# Patient Record
Sex: FEMALE | Race: WHITE | URBAN - METROPOLITAN AREA
[De-identification: names, ages, dates, MRNs, and addresses within clinical notes are randomized per-mention and may not be internally consistent; named-entity substitution may affect disease eponyms.]

---

## 2017-02-17 ENCOUNTER — OFFICE VISIT (OUTPATIENT)
Dept: URGENT CARE | Facility: RETAIL CLINIC | Age: 47
End: 2017-02-17
Payer: COMMERCIAL

## 2017-02-17 VITALS
HEART RATE: 91 BPM | TEMPERATURE: 98.5 F | OXYGEN SATURATION: 99 % | DIASTOLIC BLOOD PRESSURE: 87 MMHG | SYSTOLIC BLOOD PRESSURE: 132 MMHG

## 2017-02-17 DIAGNOSIS — J02.9 ACUTE PHARYNGITIS, UNSPECIFIED ETIOLOGY: Primary | ICD-10-CM

## 2017-02-17 DIAGNOSIS — J01.90 ACUTE SINUSITIS WITH SYMPTOMS > 10 DAYS: ICD-10-CM

## 2017-02-17 PROCEDURE — 87081 CULTURE SCREEN ONLY: CPT | Performed by: NURSE PRACTITIONER

## 2017-02-17 PROCEDURE — 99202 OFFICE O/P NEW SF 15 MIN: CPT | Performed by: NURSE PRACTITIONER

## 2017-02-17 PROCEDURE — 87880 STREP A ASSAY W/OPTIC: CPT | Mod: QW | Performed by: NURSE PRACTITIONER

## 2017-02-17 NOTE — MR AVS SNAPSHOT
"              After Visit Summary   2017    Nichole Childress    MRN: [id number]           Patient Information     Date Of Birth          1970        Visit Information        Provider Department      2017 6:40 PM Kamlesh Borja APRN [facility]        Today's Diagnoses     Acute pharyngitis, unspecified etiology    -  1    Acute sinusitis with symptoms > 10 days           Follow-ups after your visit        Who to contact     You can reach your care team any time of the day by calling [phone].  Notification of test results:  If you have an abnormal lab result, we will notify you by phone as soon as possible.         Additional Information About Your Visit        MyChart Information     [employer] lets you send messages to your doctor, view your test results, renew your prescriptions, schedule appointments and more. To sign up, go to [email or web address]: turn off the lights . Click on \"Log in\" on the left side of the screen, which will take you to the Welcome page. Then click on \"Sign up Now\" on the right side of the page.     You will be asked to enter the access code listed below, as well as some personal information. Please follow the directions to create your username and password.     Your access code is: [email or web address]  Expires: 2017  7:43 PM     Your access code will  in 90 days. If you need help or a new code, please call your [place] clinic or [phone].        Care EveryWhere ID     This is your [facility] EveryWhere ID. This could be used by other organizations to access your [place] medical records  [id number]        Your Vitals Were     Pulse Temperature Pulse Oximetry             91 98.5  F (36.9  C) (Oral) 99%          Blood Pressure from Last 3 Encounters:   17 132/87   13 132/87   12 126/84    Weight from Last 3 Encounters:   12 251 lb 9.6 oz (114.1 kg)   09 268 lb 12.8 oz (121.9 kg)   09 268 lb (121.6 kg)              We Performed " the Following     BETA STREP GROUP A R/O CULTURE     RAPID STREP SCREEN          Today's Medication Changes          These changes are accurate as of: 2/17/17  7:43 PM.  If you have any questions, ask your nurse or doctor.               Start taking these medicines.        Dose/Directions    amoxicillin-clavulanate 875-125 MG per tablet   Commonly known as:  AUGMENTIN   Used for:  Acute sinusitis with symptoms > 10 days        Dose:  1 tablet   Take 1 tablet by mouth 2 times daily for 14 days   Quantity:  28 tablet   Refills:  0            Where to get your medicines      These medications were sent to [address]  [address] [address]     Phone:  [phone]     amoxicillin-clavulanate 875-125 MG per tablet                Primary Care Provider Office Phone # Fax #    Ladonna Leger [phone]       [facility]  [address] [address]        Thank you!     Thank you for choosing [facility]  for your care. Our goal is always to provide you with excellent care. Hearing back from our patients is one way we can continue to improve our services. Please take a few minutes to complete the written survey that you may receive in the mail after your visit with us. Thank you!             Your Updated Medication List - Protect others around you: Learn how to safely use, store and throw away your medicines at www.disposemymeds.org.          This list is accurate as of: 2/17/17  7:43 PM.  Always use your most recent med list.                   Brand Name Dispense Instructions for use    ALEVE 220 MG capsule   Generic drug:  naproxen sodium      1 CAPSULE EVERY 12 HOURS AS NEEDED       amoxicillin-clavulanate 875-125 MG per tablet    AUGMENTIN    28 tablet    Take 1 tablet by mouth 2 times daily for 14 days       COQ10 PO          FISH OIL          MOTRIN 400 MG tablet   Generic drug:  ibuprofen      1 TABLET EVERY 4  HOURS AS NEEDED FOR PAIN       MULTIVITAMIN & MINERAL PO          VITAMIN B PLUS+ PO          VITAMIN D PO      Reported on 2/17/2017       ZINC 15 PO

## 2017-02-18 NOTE — PROGRESS NOTES
[facility] clinic note    SUBJECTIVE:  Nichole Childress is a 46 year old female who presents to [facility] clinic with chief complaint of sore throat.    Onset of symptoms was 2 week(s) ago.    Course of illness: still present.    Severity moderate  Course of illness:  SIGNIFICANTLY WORSE TODAY  Current and Associated symptoms: nasal congestion, rhinorrhea, cough, feels flushed, throat felt like closing last night, sore throat, facial pain/pressure, hoarse voice, headache and malaise  Treatment measures tried at home include OTC meds and home remedies.  Predisposing factors include recent illness and travel.    Current Outpatient Prescriptions   Medication     Multiple Vitamins-Minerals (MULTIVITAMIN & MINERAL PO)     FISH OIL     Coenzyme Q10 (COQ10 PO)     Vit B6-Vit B12-Omega 3 Acids (VITAMIN B PLUS+ PO)     Cholecalciferol (VITAMIN D PO)     Zinc Sulfate (ZINC 15 PO)     MOTRIN 400 MG OR TABS     ALEVE 220 MG OR CAPS     No current facility-administered medications for this visit.      PAST MEDICAL HISTORY:   Past Medical History   Diagnosis Date     Backache, unspecified      Obesity, unspecified      Other specified cardiac dysrhythmias 04/03/09     D/C 04/04/09       PAST SURGICAL HISTORY:   Past Surgical History   Procedure Laterality Date     Surgical history of -   5/14/98     Gastroscopy with distal esophageal biopsy       FAMILY HISTORY: No family history on file.    SOCIAL HISTORY:   Social History   Substance Use Topics     Smoking status: Former Smoker     Quit date: 5/15/2003     Smokeless tobacco: Not on file     Alcohol use Yes      Comment: socially     ROS:  Review of systems negative except as stated above.    OBJECTIVE:   Vitals:    02/17/17 1901   BP: 132/87   BP Location: Right arm   Pulse: 91   Temp: 98.5  F (36.9  C)   TempSrc: Oral   SpO2: 99%     GENERAL APPEARANCE: alert, moderate distress and cooperative  EYES: EOMI,  PERRL, conjunctiva  clear  HENT: ear canals and TM's normal.  Nose congested.  Pharynx mildly erythematous noted.  HENT: maxillary sinus tenderness   NECK: bilateral anterior cervical adenopathy  RESP: lungs clear to auscultation - no rales, rhonchi or wheezes  CV: regular rates and rhythm, normal S1 S2, no murmur noted  ABDOMEN:  soft, nontender, no HSM or masses and bowel sounds normal  SKIN: no suspicious lesions or rashes     Rapid Strep test is negative; await throat culture results.    ASSESSMENT:  Sore Throat J02.9  Sinusitis >10 days J01.90    PLAN:   Augmentin  If not improving Follow up at:  [facility] [phone]  Encourage good hydration (mainly water), may drink tea /c honey, warm chicken broth to sooth throat.  Soft foods may be preferred for several days.  Symptomatic treatment with warm Na+ H2O gargles, OTC analgesic, etc. discussed.   Strep culture pending.   Nichole Childress told positive cultures called only.  Rest as needed.  Follow-up with primary care provider if not improving.    If difficulty breathing or swallowing be seen immediately in the ED.    Kamlesh CORDON, MSN, Family NP-C  [facility]

## 2017-02-18 NOTE — NURSING NOTE
"Chief Complaint   Patient presents with     Pharyngitis     since last tuesday, throat seems to hurt more in the morning, no fevers but has been having night sweats     Sinus Problem     since last night, sinus pressure     Cough     since last tuesday       Initial /87 (BP Location: Right arm)  Pulse 91  Temp 98.5  F (36.9  C) (Oral)  SpO2 99% Estimated body mass index is 40 kg/(m^2) as calculated from the following:    Height as of 9/12/12: 5' 6.5\" (1.689 m).    Weight as of 9/12/12: 251 lb 9.6 oz (114.1 kg).  Medication Reconciliation: complete    "

## 2017-02-20 LAB — BETA STREP CONFIRM: NORMAL

## 2017-10-15 ENCOUNTER — HOSPITAL ENCOUNTER (EMERGENCY)
Facility: CLINIC | Age: 47
Discharge: HOME | End: 2017-10-16
Attending: EMERGENCY MEDICINE | Admitting: EMERGENCY MEDICINE
Payer: COMMERCIAL

## 2017-10-15 ENCOUNTER — APPOINTMENT (OUTPATIENT)
Dept: GENERAL RADIOLOGY | Facility: CLINIC | Age: 47
End: 2017-10-15
Attending: EMERGENCY MEDICINE
Payer: COMMERCIAL

## 2017-10-15 DIAGNOSIS — R07.89 CHEST WALL PAIN: ICD-10-CM

## 2017-10-15 LAB
ALBUMIN SERPL-MCNC: 4 G/DL (ref 3.4–5)
ALP SERPL-CCNC: 59 U/L (ref 40–150)
ALT SERPL W P-5'-P-CCNC: 27 U/L (ref 0–50)
ANION GAP SERPL CALCULATED.3IONS-SCNC: 7 MMOL/L (ref 3–14)
AST SERPL W P-5'-P-CCNC: 23 U/L (ref 0–45)
BASOPHILS # BLD AUTO: 0 10E9/L (ref 0–0.2)
BASOPHILS NFR BLD AUTO: 0.5 %
BILIRUB SERPL-MCNC: 0.5 MG/DL (ref 0.2–1.3)
BUN SERPL-MCNC: 10 MG/DL (ref 7–30)
CALCIUM SERPL-MCNC: 9.2 MG/DL (ref 8.5–10.1)
CHLORIDE SERPL-SCNC: 107 MMOL/L (ref 94–109)
CO2 SERPL-SCNC: 25 MMOL/L (ref 20–32)
CREAT SERPL-MCNC: 0.66 MG/DL (ref 0.52–1.04)
CRP SERPL-MCNC: <2.9 MG/L (ref 0–8)
D DIMER PPP FEU-MCNC: 0.4 UG/ML FEU (ref 0–0.5)
DIFFERENTIAL METHOD BLD: NORMAL
EOSINOPHIL # BLD AUTO: 0 10E9/L (ref 0–0.7)
EOSINOPHIL NFR BLD AUTO: 0.5 %
ERYTHROCYTE [DISTWIDTH] IN BLOOD BY AUTOMATED COUNT: 13.1 % (ref 10–15)
GFR SERPL CREATININE-BSD FRML MDRD: >90 ML/MIN/1.7M2
GLUCOSE SERPL-MCNC: 96 MG/DL (ref 70–99)
HCT VFR BLD AUTO: 41.2 % (ref 35–47)
HGB BLD-MCNC: 13.2 G/DL (ref 11.7–15.7)
IMM GRANULOCYTES # BLD: 0 10E9/L (ref 0–0.4)
IMM GRANULOCYTES NFR BLD: 0.1 %
LACTATE BLD-SCNC: 1.3 MMOL/L (ref 0.7–2)
LIPASE SERPL-CCNC: 194 U/L (ref 73–393)
LYMPHOCYTES # BLD AUTO: 3 10E9/L (ref 0.8–5.3)
LYMPHOCYTES NFR BLD AUTO: 34.7 %
MCH RBC QN AUTO: 30.6 PG (ref 26.5–33)
MCHC RBC AUTO-ENTMCNC: 32 G/DL (ref 31.5–36.5)
MCV RBC AUTO: 96 FL (ref 78–100)
MONOCYTES # BLD AUTO: 0.8 10E9/L (ref 0–1.3)
MONOCYTES NFR BLD AUTO: 8.8 %
NEUTROPHILS # BLD AUTO: 4.7 10E9/L (ref 1.6–8.3)
NEUTROPHILS NFR BLD AUTO: 55.4 %
PLATELET # BLD AUTO: 326 10E9/L (ref 150–450)
POTASSIUM SERPL-SCNC: 3.4 MMOL/L (ref 3.4–5.3)
PROT SERPL-MCNC: 8.1 G/DL (ref 6.8–8.8)
RBC # BLD AUTO: 4.31 10E12/L (ref 3.8–5.2)
SODIUM SERPL-SCNC: 139 MMOL/L (ref 133–144)
TROPONIN I SERPL-MCNC: <0.015 UG/L (ref 0–0.04)
WBC # BLD AUTO: 8.5 10E9/L (ref 4–11)

## 2017-10-15 PROCEDURE — 25000128 H RX IP 250 OP 636: Performed by: EMERGENCY MEDICINE

## 2017-10-15 PROCEDURE — 93010 ELECTROCARDIOGRAM REPORT: CPT | Mod: 59 | Performed by: EMERGENCY MEDICINE

## 2017-10-15 PROCEDURE — 93005 ELECTROCARDIOGRAM TRACING: CPT | Performed by: EMERGENCY MEDICINE

## 2017-10-15 PROCEDURE — 96360 HYDRATION IV INFUSION INIT: CPT | Performed by: EMERGENCY MEDICINE

## 2017-10-15 PROCEDURE — 84484 ASSAY OF TROPONIN QUANT: CPT | Performed by: EMERGENCY MEDICINE

## 2017-10-15 PROCEDURE — 83690 ASSAY OF LIPASE: CPT | Performed by: EMERGENCY MEDICINE

## 2017-10-15 PROCEDURE — 85379 FIBRIN DEGRADATION QUANT: CPT | Performed by: EMERGENCY MEDICINE

## 2017-10-15 PROCEDURE — 80053 COMPREHEN METABOLIC PANEL: CPT | Performed by: EMERGENCY MEDICINE

## 2017-10-15 PROCEDURE — 83605 ASSAY OF LACTIC ACID: CPT | Performed by: EMERGENCY MEDICINE

## 2017-10-15 PROCEDURE — 71020 XR CHEST 2 VW: CPT | Mod: TC

## 2017-10-15 PROCEDURE — 86140 C-REACTIVE PROTEIN: CPT | Performed by: EMERGENCY MEDICINE

## 2017-10-15 PROCEDURE — 85025 COMPLETE CBC W/AUTO DIFF WBC: CPT | Performed by: EMERGENCY MEDICINE

## 2017-10-15 PROCEDURE — 99285 EMERGENCY DEPT VISIT HI MDM: CPT | Mod: 25 | Performed by: EMERGENCY MEDICINE

## 2017-10-15 RX ORDER — SODIUM CHLORIDE 9 MG/ML
1000 INJECTION, SOLUTION INTRAVENOUS CONTINUOUS
Status: DISCONTINUED | OUTPATIENT
Start: 2017-10-15 | End: 2017-10-16 | Stop reason: HOSPADM

## 2017-10-15 RX ADMIN — SODIUM CHLORIDE 1000 ML: 9 INJECTION, SOLUTION INTRAVENOUS at 22:57

## 2017-10-15 NOTE — ED AVS SNAPSHOT
[facility] Emergency Department    [address] [place] [address]    Phone:  [phone]    Fax:  [phone]                                       Nichole Childress   MRN: [id number]    Department:  [facility] Emergency Department   Date of Visit:  10/15/2017           After Visit Summary Signature Page     I have received my discharge instructions, and my questions have been answered. I have discussed any challenges I see with this plan with the nurse or doctor.    ..........................................................................................................................................  Patient/Patient Representative Signature      ..........................................................................................................................................  Patient Representative Print Name and Relationship to Patient    ..................................................               ................................................  Date                                            Time    ..........................................................................................................................................  Reviewed by Signature/Title    ...................................................              ..............................................  Date                                                            Time

## 2017-10-15 NOTE — ED AVS SNAPSHOT
[facility] Emergency Department    [address] [place] [address]    Phone:  [phone]    Fax:  [phone]                                       Nichole Childress   MRN: [id number]    Department:  [facility] Emergency Department   Date of Visit:  10/15/2017           Patient Information     Date Of Birth          1970        Your diagnoses for this visit were:     Chest wall pain        You were seen by Karthik Campuzano MD.      Follow-up Information     Schedule an appointment as soon as possible for a visit with Justin Wray MD.    Specialty:  Family Practice    Contact information:    [address] [place] [address]  [phone]          Discharge Instructions         Chest Wall Pain: Costochondritis    The chest pain that you have had today is caused by costochondritis. This condition is caused by an inflammation of the cartilage joining your ribs to your breastbone. It is not caused by heart or lung problems. Your healthcare team has made sure that the chest pain you feel is not from a life threatening cause of chest pain such as heart attack, collapsed lung, blood clot in the lung, tear in the aorta, or esophageal rupture. The inflammation may have been brought on by a blow to the chest, lifting heavy objects, intense exercise, or an illness that made you cough and sneeze a lot. It often occurs during times of emotional stress. It can be painful, but it is not dangerous. It usually goes away in 1 to 2 weeks. But it may happen again. Rarely, a more serious condition may cause symptoms similar to costochondritis. That s why it s important to watch for the warning signs listed below.  Home care  Follow these guidelines when caring for yourself at home:    If you feel that emotional stress is a cause of your condition, try to figure out the sources of that stress. It may not be obvious. Learn ways to deal with the stress in your life. This can include  regular exercise, muscle relaxation, meditation, or simply taking time out for yourself.    You may use acetaminophen, ibuprofen, or naproxen to control pain, unless another pain medicine was prescribed. If you have liver or kidney disease or ever had a stomach ulcer, talk with your healthcare provider before using these medicines.    You can also help ease pain by using a hot, wet compress or heating pad. Use this with or without a medicated skin cream that helps relieves pain.    Do stretching exercise as advised by your provider.    Take any prescribed medicines as directed.  Follow-up care  Follow up with your healthcare provider, or as advised, if you do not start to get better in the next 2 days.  When to seek medical advice  Call your healthcare provider right away if any of these occur:    A change in the type of pain. Call if it feels different, becomes more serious, lasts longer, or spreads into your shoulder, arm, neck, jaw, or back.    Shortness of breath or pain gets worse when you breathe    Weakness, dizziness, or fainting    Cough with dark-colored sputum (phlegm) or blood    Abdominal pain    Dark red or black stools    Fever of 100.4 F (38 C) or higher, or as directed by your healthcare provider  Date Last Reviewed: 12/1/2016 2000-2017 [employer]. [address] [place] [address], [place] [address]. All rights reserved. This information is not intended as a substitute for professional medical care. Always follow your healthcare professional's instructions.      I would recommend that you contact the sleep center and arranged for a sleep study..  You may also do this through your primary care provider in the clinic.     Discharge References/Attachments     SLEEP APNEA, OBSTRUCTIVE (ENGLISH)      24 Hour Appointment Hotline       To make an appointment at any [facility], call [phone] ([phone]). If you don't have a family doctor or clinic, we will help you find one. [facility]  clinics are conveniently located to serve the needs of you and your family.             Review of your medicines      Our records show that you are taking the medicines listed below. If these are incorrect, please call your family doctor or clinic.        Dose / Directions Last dose taken    ALEVE 220 MG capsule   Generic drug:  naproxen sodium        1 CAPSULE EVERY 12 HOURS AS NEEDED   Refills:  0        aspirin 81 MG tablet   Dose:  81 mg        Take 81 mg by mouth once   Refills:  0        COQ10 PO        Refills:  0        FISH OIL        Refills:  0        MOTRIN 400 MG tablet   Generic drug:  ibuprofen        1 TABLET EVERY 4 HOURS AS NEEDED FOR PAIN   Refills:  0        MULTIVITAMIN & MINERAL PO        Refills:  0        VITAMIN B PLUS+ PO        Refills:  0        VITAMIN D PO        Reported on 2/17/2017   Refills:  0        ZINC 15 PO        Refills:  0                Procedures and tests performed during your visit     CBC with platelets differential    CRP inflammation    Comprehensive metabolic panel    D dimer quantitative    EKG 12-lead, tracing only    Lactic acid whole blood    Lipase    Peripheral IV: Standard    Troponin I    XR Chest 2 Views      Orders Needing Specimen Collection     None      Pending Results     No orders found for last 3 day(s).            Pending Culture Results     No orders found for last 3 day(s).            Pending Results Instructions     If you had any lab results that were not finalized at the time of your Discharge, you can call the ED Lab Result RN at [phone]. You will be contacted by this team for any positive Lab results or changes in treatment. The nurses are available 7 days a week from 10A to 6:30P.  You can leave a message 24 hours per day and they will return your call.        Thank you for choosing [facility]       Thank you for choosing [facility] for your care. Our goal is always to provide you with excellent care. Hearing back from our patients is one  "way we can continue to improve our services. Please take a few minutes to complete the written survey that you may receive in the mail after you visit with us. Thank you!        [employer] Information     [employer] lets you send messages to your doctor, view your test results, renew your prescriptions, schedule appointments and more. To sign up, go to [email or web address] . Click on \"Log in\" on the left side of the screen, which will take you to the Welcome page. Then click on \"Sign up Now\" on the right side of the page.     You will be asked to enter the access code listed below, as well as some personal information. Please follow the directions to create your username and password.     Your access code is: [email or web address]  Expires: 2018 11:59 PM     Your access code will  in 90 days. If you need help or a new code, please call your [place] clinic or [phone].        Care EveryWhere ID     This is your Care EveryWhere ID. This could be used by other organizations to access your [place] medical records  [id number]        Equal Access to Services     [facility] : Hadjune Roth, delonte fierro, ankita lee, jarett patel. So [facility] [phone].    ATENCIÓN: Si habla español, tiene a [place] disposición servicios gratuitos de asistencia lingüística. Stephen al [phone].    We comply with applicable federal civil rights laws and [place] laws. We do not discriminate on the basis of race, color, national origin, age, disability, sex, sexual orientation, or gender identity.            After Visit Summary       This is your record. Keep this with you and show to your community pharmacist(s) and doctor(s) at your next visit.                  "

## 2017-10-16 VITALS
SYSTOLIC BLOOD PRESSURE: 139 MMHG | OXYGEN SATURATION: 99 % | BODY MASS INDEX: 31.08 KG/M2 | WEIGHT: 198 LBS | RESPIRATION RATE: 18 BRPM | DIASTOLIC BLOOD PRESSURE: 89 MMHG | HEIGHT: 67 IN | TEMPERATURE: 98.2 F | HEART RATE: 64 BPM

## 2017-10-16 ASSESSMENT — ENCOUNTER SYMPTOMS
CHILLS: 0
COUGH: 0
PALPITATIONS: 0
SHORTNESS OF BREATH: 0
NAUSEA: 0
FEVER: 0

## 2017-10-16 NOTE — DISCHARGE INSTRUCTIONS
Chest Wall Pain: Costochondritis    The chest pain that you have had today is caused by costochondritis. This condition is caused by an inflammation of the cartilage joining your ribs to your breastbone. It is not caused by heart or lung problems. Your healthcare team has made sure that the chest pain you feel is not from a life threatening cause of chest pain such as heart attack, collapsed lung, blood clot in the lung, tear in the aorta, or esophageal rupture. The inflammation may have been brought on by a blow to the chest, lifting heavy objects, intense exercise, or an illness that made you cough and sneeze a lot. It often occurs during times of emotional stress. It can be painful, but it is not dangerous. It usually goes away in 1 to 2 weeks. But it may happen again. Rarely, a more serious condition may cause symptoms similar to costochondritis. That s why it s important to watch for the warning signs listed below.  Home care  Follow these guidelines when caring for yourself at home:    If you feel that emotional stress is a cause of your condition, try to figure out the sources of that stress. It may not be obvious. Learn ways to deal with the stress in your life. This can include regular exercise, muscle relaxation, meditation, or simply taking time out for yourself.    You may use acetaminophen, ibuprofen, or naproxen to control pain, unless another pain medicine was prescribed. If you have liver or kidney disease or ever had a stomach ulcer, talk with your healthcare provider before using these medicines.    You can also help ease pain by using a hot, wet compress or heating pad. Use this with or without a medicated skin cream that helps relieves pain.    Do stretching exercise as advised by your provider.    Take any prescribed medicines as directed.  Follow-up care  Follow up with your healthcare provider, or as advised, if you do not start to get better in the next 2 days.  When to seek medical  advice  Call your healthcare provider right away if any of these occur:    A change in the type of pain. Call if it feels different, becomes more serious, lasts longer, or spreads into your shoulder, arm, neck, jaw, or back.    Shortness of breath or pain gets worse when you breathe    Weakness, dizziness, or fainting    Cough with dark-colored sputum (phlegm) or blood    Abdominal pain    Dark red or black stools    Fever of 100.4 F (38 C) or higher, or as directed by your healthcare provider  Date Last Reviewed: 12/1/2016 2000-2017 [employer]. [address] [place] [address]. All rights reserved. This information is not intended as a substitute for professional medical care. Always follow your healthcare professional's instructions.      I would recommend that you contact the sleep center and arranged for a sleep study..  You may also do this through your primary care provider in the clinic.

## 2017-10-16 NOTE — ED NOTES
Pt very chatty in room, states she is not really having pain it is more like just kind a different type feeling.

## 2017-10-16 NOTE — ED PROVIDER NOTES
History     Chief Complaint   Patient presents with     Chest Pain     The history is provided by the patient.     Nichole Childress is a 47 year old female who presents to ED for evaluation of left-sided chest pressure/pain that started several days ago and continues to tonight.  She also had an episode last night where she was startled awake from sleep with this pain.  She denies any fever, chills, cough, shortness of breath, nausea or vomiting.  She does have a history significant for paroxysmal supraventricular tachycardia and is status post ablation for this.  She also has a presenting complaint of possible obstructive sleep apnea as the , is that she is quite active during sleep and she reports that she has daytime somnolence and feels tired even after sleeping.    I have reviewed the Medications, Allergies, Past Medical and Surgical History, and Social History in the Epic system.    Allergies: No Known Allergies      No current facility-administered medications on file prior to encounter.   Current Outpatient Prescriptions on File Prior to Encounter:  Multiple Vitamins-Minerals (MULTIVITAMIN & MINERAL PO)    FISH OIL    Coenzyme Q10 (COQ10 PO)    Vit B6-Vit B12-Omega 3 Acids (VITAMIN B PLUS+ PO)    Zinc Sulfate (ZINC 15 PO)    MOTRIN 400 MG OR TABS 1 TABLET EVERY 4 HOURS AS NEEDED FOR PAIN   ALEVE 220 MG OR CAPS 1 CAPSULE EVERY 12 HOURS AS NEEDED   Cholecalciferol (VITAMIN D PO) Reported on 2/17/2017       Patient Active Problem List   Diagnosis     SVT (supraventricular tachycardia) (H)     CARDIOVASCULAR SCREENING; LDL GOAL LESS THAN 160       Past Surgical History:   Procedure Laterality Date     CARDIAC SURGERY       SURGICAL HISTORY OF -   5/14/98    Gastroscopy with distal esophageal biopsy       Social History   Substance Use Topics     Smoking status: Former Smoker     Quit date: 5/15/2003     Smokeless tobacco: Never Used     Alcohol use Yes      Comment: socially       Most Recent  "Immunizations   Administered Date(s) Administered     Influenza (IIV3) 11/12/1997     TDAP Vaccine (Adacel) 07/23/2009       BMI: Estimated body mass index is 31.01 kg/(m^2) as calculated from the following:    Height as of this encounter: 1.702 m (5' 7\").    Weight as of this encounter: 89.8 kg (198 lb).      Review of Systems   Constitutional: Negative for chills and fever.   Respiratory: Negative for cough and shortness of breath.    Cardiovascular: Positive for chest pain. Negative for palpitations.   Gastrointestinal: Negative for nausea.   All other systems reviewed and are negative.      Physical Exam   BP: 142/90  Pulse: 64  Heart Rate: 66  Temp: 98.2  F (36.8  C)  Resp: 20  Height: 170.2 cm (5' 7\")  Weight: 89.8 kg (198 lb)  SpO2: 99 %       Physical Exam   Constitutional: She is oriented to person, place, and time. She appears well-developed. No distress.   HENT:   Head: Atraumatic.   Mouth/Throat: Oropharynx is clear and moist. No oropharyngeal exudate.   Eyes: EOM are normal. Pupils are equal, round, and reactive to light. No scleral icterus.   Neck: Normal range of motion. Neck supple.   Cardiovascular: Normal rate, normal heart sounds and intact distal pulses.    Pulmonary/Chest: Breath sounds normal. No respiratory distress. She exhibits tenderness (Tenderness to palpation over the upper lateral left chest that reproduces her symptoms.).   Abdominal: Soft. Bowel sounds are normal. There is no tenderness.   Musculoskeletal: Normal range of motion. She exhibits no edema or tenderness.   Neurological: She is alert and oriented to person, place, and time.   Skin: Skin is warm. No rash noted. She is not diaphoretic.   Nursing note and vitals reviewed.      ED Course     ED Course     Procedures          EKG: 10/15/2017 2237 This EKG was reviewed and interpreted by me.  Normal sinus rhythm with rate of 73 beats per minute.  Incomplete right bundle-branch block with a left anterior fascicular block.  LVH by " voltage criteria.  Normal IN interval.  Normal ST segments.  No previous EKG to compare.    Results for orders placed or performed during the hospital encounter of 10/15/17 (from the past 48 hour(s))   CBC with platelets differential   Result Value Ref Range    WBC 8.5 4.0 - 11.0 10e9/L    RBC Count 4.31 3.8 - 5.2 10e12/L    Hemoglobin 13.2 11.7 - 15.7 g/dL    Hematocrit 41.2 35.0 - 47.0 %    MCV 96 78 - 100 fl    MCH 30.6 26.5 - 33.0 pg    MCHC 32.0 31.5 - 36.5 g/dL    RDW 13.1 10.0 - 15.0 %    Platelet Count 326 150 - 450 10e9/L    Diff Method Automated Method     % Neutrophils 55.4 %    % Lymphocytes 34.7 %    % Monocytes 8.8 %    % Eosinophils 0.5 %    % Basophils 0.5 %    % Immature Granulocytes 0.1 %    Absolute Neutrophil 4.7 1.6 - 8.3 10e9/L    Absolute Lymphocytes 3.0 0.8 - 5.3 10e9/L    Absolute Monocytes 0.8 0.0 - 1.3 10e9/L    Absolute Eosinophils 0.0 0.0 - 0.7 10e9/L    Absolute Basophils 0.0 0.0 - 0.2 10e9/L    Abs Immature Granulocytes 0.0 0 - 0.4 10e9/L   D dimer quantitative   Result Value Ref Range    D Dimer 0.4 0.0 - 0.50 ug/ml FEU   Comprehensive metabolic panel   Result Value Ref Range    Sodium 139 133 - 144 mmol/L    Potassium 3.4 3.4 - 5.3 mmol/L    Chloride 107 94 - 109 mmol/L    Carbon Dioxide 25 20 - 32 mmol/L    Anion Gap 7 3 - 14 mmol/L    Glucose 96 70 - 99 mg/dL    Urea Nitrogen 10 7 - 30 mg/dL    Creatinine 0.66 0.52 - 1.04 mg/dL    GFR Estimate >90 >60 mL/min/1.7m2    GFR Estimate If Black >90 >60 mL/min/1.7m2    Calcium 9.2 8.5 - 10.1 mg/dL    Bilirubin Total 0.5 0.2 - 1.3 mg/dL    Albumin 4.0 3.4 - 5.0 g/dL    Protein Total 8.1 6.8 - 8.8 g/dL    Alkaline Phosphatase 59 40 - 150 U/L    ALT 27 0 - 50 U/L    AST 23 0 - 45 U/L   Lipase   Result Value Ref Range    Lipase 194 73 - 393 U/L   Lactic acid whole blood   Result Value Ref Range    Lactic Acid 1.3 0.7 - 2.0 mmol/L   Troponin I   Result Value Ref Range    Troponin I ES <0.015 0.000 - 0.045 ug/L   CRP inflammation   Result Value  Ref Range    CRP Inflammation <2.9 0.0 - 8.0 mg/L   XR Chest 2 Views    Narrative    XR CHEST 2 VW   10/15/2017 11:19 PM     INDICATION: Chest pain.    COMPARISON: 4/3/2009.      Impression    IMPRESSION: No infiltrates or other acute findings. Heart size is  within normal limits. No pneumothorax.    SARAH OLMSTEAD MD                Assessments & Plan (with Medical Decision Making)  Nichole Childress is a 37-year-old female presents to the ED for evaluation of left-sided upper chest pain that started several days ago and continues to persist.  The patient became especially anxious yesterday when she awoke from sleep with a startle and onset of this pain.  Since then she's been very concerned that this may be cardiac.  She does have a history significant for PSVT and actually had ablation therapy for this.  She has otherwise been pretty healthy except for some obesity issues.  On exam, she is quite anxious but in no acute distress.  I am able to reproduce the pain with palpation over the left upper chest especially over the pectoralis muscle.  She does have a history significant for injury to this area several years ago and reports that the pain was similar at that time.  She cannot recall what may have precipitated the pain this time.  Her exam is otherwise unremarkable.  EKG shows normal sinus rhythm with a rate of 73 bpm and an incomplete right bundle-branch block with left anterior fascicular block.  There is no evidence for acute ischemia or injury.  Labs were obtained and are essentially normal including a CBC, comprehensive metabolic panel, lipase, lactic acid, troponin I, C-reactive protein, and d-dimer.  Chest x-ray is also unremarkable.  Given this and the findings were labs, most likely that this is a pectoralis muscle strain or chest wall inflammation.  I reassured her that I do not see this being anything serious and advised her to take NSAIDs to treat the pain.  An additional item became apparent in  examining in discussing presenting symptoms with the patient and that it appears that she may have sleep apnea and elevated than what was responsible for her startle yesterday.  The  reports that she is very active during sleep and she reports waking up feeling quite tired.  These both may be significant for obstructive sleep apnea which could have a major impact on her mortality morbidity.  I did advise her to consider getting a sleep study which we can perform at [facility] and encouraged her to either contact him directly or contact her primary care provider to arrange this.  All questions from the patient and  were answered and she was suitable for discharge in satisfactory condition.       I have reviewed the nursing notes.    I have reviewed the findings, diagnosis, plan and need for follow up with the patient.       Discharge Medication List as of 10/16/2017 12:06 AM          Final diagnoses:   Chest wall pain       10/15/2017   [facility] EMERGENCY DEPARTMENT     Karthik Campuzano MD  10/16/17 0107

## 2017-10-16 NOTE — ED NOTES
Pt in with chest pressure/pain started several days ago and tonight she had an increase in anxiety and wanted to have it checked out.